# Patient Record
Sex: FEMALE | Race: OTHER | NOT HISPANIC OR LATINO | URBAN - METROPOLITAN AREA
[De-identification: names, ages, dates, MRNs, and addresses within clinical notes are randomized per-mention and may not be internally consistent; named-entity substitution may affect disease eponyms.]

---

## 2020-12-31 ENCOUNTER — EMERGENCY (EMERGENCY)
Facility: HOSPITAL | Age: 45
LOS: 1 days | Discharge: HOME | End: 2020-12-31
Attending: EMERGENCY MEDICINE
Payer: MEDICAID

## 2020-12-31 VITALS
OXYGEN SATURATION: 98 % | SYSTOLIC BLOOD PRESSURE: 169 MMHG | DIASTOLIC BLOOD PRESSURE: 94 MMHG | HEART RATE: 93 BPM | RESPIRATION RATE: 16 BRPM | TEMPERATURE: 98 F | WEIGHT: 210.1 LBS

## 2020-12-31 LAB
HIV 1 & 2 AB SERPL IA.RAPID: SIGNIFICANT CHANGE UP
SARS-COV-2 RNA SPEC QL NAA+PROBE: SIGNIFICANT CHANGE UP

## 2020-12-31 PROCEDURE — 36415 COLL VENOUS BLD VENIPUNCTURE: CPT

## 2020-12-31 PROCEDURE — 86703 HIV-1/HIV-2 1 RESULT ANTBDY: CPT

## 2020-12-31 PROCEDURE — 87635 SARS-COV-2 COVID-19 AMP PRB: CPT

## 2020-12-31 PROCEDURE — 99283 EMERGENCY DEPT VISIT LOW MDM: CPT

## 2020-12-31 NOTE — ED PROVIDER NOTE - CLINICAL SUMMARY MEDICAL DECISION MAKING FREE TEXT BOX
Character low suspicion for PE and no recent risk factors for this or evidence of DVT. Character low suspicion for ACS. Character and exam low suspicion for CHF or cardiomyopathy. Character and context consistent with and concerning for COVID-19. No e/o PNA, PTX, fluid overload, or asthma exacerbation on exam. No WOB, satting well. Well hydrated Character low suspicion for PE and no recent risk factors for this or evidence of DVT. Character low suspicion for ACS. Character and exam low suspicion for CHF or cardiomyopathy. Character and context consistent with and concerning for COVID-19. No e/o PNA, PTX, fluid overload, or asthma exacerbation on exam. No WOB, satting well. Well hydrated. Patient is well appearing, NAD, afebrile, hemodynamically stable. Any available tests and studies were discussed with patient and family. Declines to stay right at this moment for MAB.  given pulsox. Discharged with instructions in further symptomatic care, return precautions.

## 2020-12-31 NOTE — ED PROVIDER NOTE - OBJECTIVE STATEMENT
45yoF with h/o HTN, DM, asthma, presents for COVID test. Reports cough, mild intermittent chest tightness, HA, congestion x 3 days.  with same symptoms. +close exposure to COVID. Denies SOB, leg pain or swelling, vomiting, diarrhea, and all other symptoms.

## 2020-12-31 NOTE — ED PROVIDER NOTE - PATIENT PORTAL LINK FT
You can access the FollowMyHealth Patient Portal offered by [facility] by registering at the following website: [email or web address]. By joining [employer]’s FollowMyHealth portal, you will also be able to view your health information using other applications (apps) compatible with our system.

## 2020-12-31 NOTE — ED PROVIDER NOTE - NSFOLLOWUPINSTRUCTIONS_ED_ALL_ED_FT
Utilice acetaminofén según sea necesario para el dolor.  Manténgase kedar hidratado.  Regrese al departamento de emergencias si tiene dificultad para respirar, dolor en el pecho, muy débil, no puede mantenerse hidratado o cualquier otro síntoma.

## 2021-07-28 PROBLEM — Z00.00 ENCOUNTER FOR PREVENTIVE HEALTH EXAMINATION: Status: ACTIVE | Noted: 2021-07-28

## 2022-03-10 NOTE — ED PROVIDER NOTE - CARE PLAN
You can access the FollowMyHealth Patient Portal offered by [facility] by registering at the following website: [email or web address]. By joining "[employer]’s FollowMyHealth portal, you will also be able to view your health information using other applications (apps) compatible with our system. Principal Discharge DX:	Suspected COVID-19 virus infection

## 2023-10-02 NOTE — ED ADULT TRIAGE NOTE - TEMPERATURE IN CELSIUS (DEGREES C)
36.6
What Type Of Note Output Would You Prefer (Optional)?: Bullet Format
Hpi Title: Evaluation of Skin Lesions
How Severe Are Your Spot(S)?: moderate

## 2023-12-04 ENCOUNTER — APPOINTMENT (OUTPATIENT)
Dept: PULMONOLOGY | Facility: CLINIC | Age: 48
End: 2023-12-04
Payer: MEDICAID

## 2023-12-04 VITALS
OXYGEN SATURATION: 96 % | RESPIRATION RATE: 15 BRPM | HEART RATE: 74 BPM | HEIGHT: 64 IN | SYSTOLIC BLOOD PRESSURE: 138 MMHG | BODY MASS INDEX: 37.1 KG/M2 | DIASTOLIC BLOOD PRESSURE: 83 MMHG | TEMPERATURE: 98.4 F | WEIGHT: 217.31 LBS

## 2023-12-04 DIAGNOSIS — Z78.9 OTHER SPECIFIED HEALTH STATUS: ICD-10-CM

## 2023-12-04 DIAGNOSIS — I10 ESSENTIAL (PRIMARY) HYPERTENSION: ICD-10-CM

## 2023-12-04 DIAGNOSIS — M19.90 UNSPECIFIED OSTEOARTHRITIS, UNSPECIFIED SITE: ICD-10-CM

## 2023-12-04 DIAGNOSIS — Z82.49 FAMILY HISTORY OF ISCHEMIC HEART DISEASE AND OTHER DISEASES OF THE CIRCULATORY SYSTEM: ICD-10-CM

## 2023-12-04 DIAGNOSIS — E78.00 PURE HYPERCHOLESTEROLEMIA, UNSPECIFIED: ICD-10-CM

## 2023-12-04 DIAGNOSIS — E13.9 OTHER SPECIFIED DIABETES MELLITUS W/OUT COMPLICATIONS: ICD-10-CM

## 2023-12-04 PROCEDURE — 99204 OFFICE O/P NEW MOD 45 MIN: CPT

## 2023-12-04 RX ORDER — LISINOPRIL 30 MG/1
TABLET ORAL
Refills: 0 | Status: ACTIVE | COMMUNITY

## 2023-12-04 RX ORDER — METFORMIN HYDROCHLORIDE 625 MG/1
TABLET ORAL
Refills: 0 | Status: ACTIVE | COMMUNITY

## 2023-12-04 RX ORDER — ATORVASTATIN CALCIUM 80 MG/1
TABLET, FILM COATED ORAL
Refills: 0 | Status: ACTIVE | COMMUNITY

## 2024-01-08 ENCOUNTER — APPOINTMENT (OUTPATIENT)
Dept: SLEEP CENTER | Facility: CLINIC | Age: 49
End: 2024-01-08

## 2024-01-16 ENCOUNTER — APPOINTMENT (OUTPATIENT)
Dept: PULMONOLOGY | Facility: CLINIC | Age: 49
End: 2024-01-16

## 2024-03-12 ENCOUNTER — APPOINTMENT (OUTPATIENT)
Dept: PULMONOLOGY | Facility: CLINIC | Age: 49
End: 2024-03-12
Payer: COMMERCIAL

## 2024-03-12 VITALS
TEMPERATURE: 97.2 F | WEIGHT: 217 LBS | DIASTOLIC BLOOD PRESSURE: 84 MMHG | SYSTOLIC BLOOD PRESSURE: 126 MMHG | HEART RATE: 97 BPM | HEIGHT: 64 IN | BODY MASS INDEX: 37.05 KG/M2 | OXYGEN SATURATION: 98 %

## 2024-03-12 DIAGNOSIS — G47.9 SLEEP DISORDER, UNSPECIFIED: ICD-10-CM

## 2024-03-12 DIAGNOSIS — R00.2 PALPITATIONS: ICD-10-CM

## 2024-03-12 DIAGNOSIS — R06.83 SNORING: ICD-10-CM

## 2024-03-12 PROCEDURE — 99203 OFFICE O/P NEW LOW 30 MIN: CPT

## 2024-03-12 PROCEDURE — 99213 OFFICE O/P EST LOW 20 MIN: CPT

## 2024-03-12 NOTE — PHYSICAL EXAM
[Well Nourished] : well nourished [No Acute Distress] : no acute distress [Well Developed] : well developed [Enlarged Base of the Tongue] : enlarged base of the tongue [II] : Mallampati Class: II [Supple] : supple [No JVD] : no jvd [Normal Rate/Rhythm] : normal rate/rhythm [Normal S1, S2] : normal s1, s2 [No Murmurs] : no murmurs [No Resp Distress] : no resp distress [Clear to Auscultation Bilaterally] : clear to auscultation bilaterally [Benign] : benign [Not Tender] : not tender [Soft] : soft [No Clubbing] : no clubbing [Oriented x3] : oriented x3 [No Edema] : no edema [Normal Affect] : normal affect [TextBox_44] : short [TextBox_68] : diminished aeration

## 2024-03-12 NOTE — HISTORY OF PRESENT ILLNESS
[Never] : never [TextBox_4] :  48 year old patient presents for evaluation of sleep disturbance   She has  history of diabetes, hypertension, hyperlipidemia, asthma,     She has been undergoing workup for palpitations and has had Holter monitoring. She states that she sleeps well. She states  she does snore but does not have apnea. No headaches  No  history of nasal congestion or sinus problems.  She was referred for a overnight sleep study but authorization .  her PMD referred her to this office.  she had asthma exacerbation 2 + years ago in setting of URI  She uses no inhaled bronchodilator at this time   Primary doctor is Dr Guillermo     PSH:  hysterectomy       PMH:    diabetes, hypertension, hyperlipidemia, asthma, and hysterectomy     some rash , eczema     SH:   never smoker  some second hands smoke exposure   ETOH:  occasional     Occupation: works as PCA  HHA with elderly No exposure to chemicals, dust, asbestos, mold        ALLERGY:   NKDA     environmental/seasonal allergy:  in eyes ,unclear not sure       Review of Systems:   some rash , eczema no sinusitis, sinus infections, nasal obstruction no dysphagia no dry mouth   no arthritis no joint aches no joint swelling     no pneumonia no wheeze no lung cancer   no CAD no MI no chest pain no murmur no CHF  no edema   no peptic ulcer or gastritis no GERD no abdominal pain no liver disease    no thyroid disease      no bleeding   no DVT or PE   no kidney disease   no stroke no seizure

## 2024-03-12 NOTE — DISCUSSION/SUMMARY
[FreeTextEntry1] : 48 year old woman with palpitations that occur at night  She also reports snoring  She denies daytime somnolence or apneic epsidoes  She has asthma and eczema  PLAN  home overnight sleep study, ordered follow with cardiology  Further recommendations based on results.  Tolu Shaver MD